# Patient Record
Sex: FEMALE | Race: WHITE | NOT HISPANIC OR LATINO | URBAN - METROPOLITAN AREA
[De-identification: names, ages, dates, MRNs, and addresses within clinical notes are randomized per-mention and may not be internally consistent; named-entity substitution may affect disease eponyms.]

---

## 2024-03-01 ENCOUNTER — HOSPITAL ENCOUNTER (OUTPATIENT)
Dept: RADIOLOGY | Facility: HOSPITAL | Age: 19
Discharge: HOME | End: 2024-03-01
Payer: COMMERCIAL

## 2024-03-01 DIAGNOSIS — R76.12 NONSPECIFIC REACTION TO CELL MEDIATED IMMUNITY MEASUREMENT OF GAMMA INTERFERON ANTIGEN RESPONSE WITHOUT ACTIVE TUBERCULOSIS: ICD-10-CM

## 2024-03-01 PROCEDURE — 71046 X-RAY EXAM CHEST 2 VIEWS: CPT | Performed by: RADIOLOGY

## 2024-03-01 PROCEDURE — 71046 X-RAY EXAM CHEST 2 VIEWS: CPT

## 2025-07-30 ENCOUNTER — APPOINTMENT (OUTPATIENT)
Dept: PRIMARY CARE | Facility: CLINIC | Age: 20
End: 2025-07-30
Payer: COMMERCIAL

## 2025-07-30 VITALS
HEART RATE: 80 BPM | SYSTOLIC BLOOD PRESSURE: 104 MMHG | DIASTOLIC BLOOD PRESSURE: 62 MMHG | RESPIRATION RATE: 12 BRPM | WEIGHT: 107 LBS | OXYGEN SATURATION: 100 %

## 2025-07-30 DIAGNOSIS — Z00.00 HEALTH CARE MAINTENANCE: Primary | ICD-10-CM

## 2025-07-30 DIAGNOSIS — R06.00 DYSPNEA, UNSPECIFIED TYPE: ICD-10-CM

## 2025-07-30 DIAGNOSIS — E01.0 THYROMEGALY: ICD-10-CM

## 2025-07-30 PROCEDURE — 99385 PREV VISIT NEW AGE 18-39: CPT | Performed by: INTERNAL MEDICINE

## 2025-07-30 NOTE — PROGRESS NOTES
Subjective   Patient ID: Regina Felix is a 20 y.o. female who presents for No chief complaint on file..    HPI patient prefers time Sipp dated Sarai feels dyspneic at times but through her nose uses saline drops and they seem to help her sometimes anxious she does begin for the past 5 or 7 years does not take supplements has normal menstrual cycle slightly enlarged thyroid    Past medical history unremarkable    Medications none at the current time    Allergies no known drug allergies    Social history no tobacco no alcohol    Family history unremarkable    Prevention some exercise no prior results for review vital signs noted alert and oriented x 3 NCAT PERRLA EOMI nares without discharge OP benign TM normal bilateral EAC clear bilateral no AC nodes no JVD no thyromegaly    Review of Systems    Objective   There were no vitals taken for this visit.    Physical Exam    Assessment/Plan mild if any chest clear to auscultation cor regular rate and rhythm S1-S2 abdomen soft nontender normal active bowel sounds LS spine normal curvature negative straight leg raise extremities no clubbing cyanosis or edema normal distal pulses DTR 2+     impression General Medical examination dyspnea thyromegaly  Plan secondary to being in status would like to check her vitamin B12 will also check CBC and iron and advise on supplement send same good diet regular exercise good water consumption May continue with the nasal saline check TSH and total cholesterol and advise on same check Chem-7 advised on glucose potassium and kidney function follow-up with GYN and then recheck based on above TT 50 cc 26

## 2025-07-31 LAB
ANION GAP SERPL CALCULATED.4IONS-SCNC: 10 MMOL/L (CALC) (ref 7–17)
BUN SERPL-MCNC: 11 MG/DL (ref 7–25)
BUN/CREAT SERPL: NORMAL (CALC) (ref 6–22)
CALCIUM SERPL-MCNC: 9.4 MG/DL (ref 8.6–10.2)
CHLORIDE SERPL-SCNC: 103 MMOL/L (ref 98–110)
CHOLEST SERPL-MCNC: 234 MG/DL
CO2 SERPL-SCNC: 25 MMOL/L (ref 20–32)
CREAT SERPL-MCNC: 0.69 MG/DL (ref 0.5–0.96)
EGFRCR SERPLBLD CKD-EPI 2021: 127 ML/MIN/1.73M2
ERYTHROCYTE [DISTWIDTH] IN BLOOD BY AUTOMATED COUNT: 12.7 % (ref 11–15)
GLUCOSE SERPL-MCNC: 90 MG/DL (ref 65–99)
HCT VFR BLD AUTO: 42.4 % (ref 35–45)
HGB BLD-MCNC: 13.4 G/DL (ref 11.7–15.5)
IRON SATN MFR SERPL: 11 % (CALC) (ref 16–45)
IRON SERPL-MCNC: 55 MCG/DL (ref 40–190)
MCH RBC QN AUTO: 27.2 PG (ref 27–33)
MCHC RBC AUTO-ENTMCNC: 31.6 G/DL (ref 32–36)
MCV RBC AUTO: 86 FL (ref 80–100)
PLATELET # BLD AUTO: 281 THOUSAND/UL (ref 140–400)
PMV BLD REES-ECKER: 10.8 FL (ref 7.5–12.5)
POTASSIUM SERPL-SCNC: 4.2 MMOL/L (ref 3.5–5.3)
RBC # BLD AUTO: 4.93 MILLION/UL (ref 3.8–5.1)
SODIUM SERPL-SCNC: 138 MMOL/L (ref 135–146)
TIBC SERPL-MCNC: 500 MCG/DL (CALC) (ref 250–450)
TSH SERPL-ACNC: 1.13 MIU/L
VIT B12 SERPL-MCNC: 280 PG/ML (ref 200–1100)
WBC # BLD AUTO: 6.7 THOUSAND/UL (ref 3.8–10.8)